# Patient Record
Sex: FEMALE | Race: BLACK OR AFRICAN AMERICAN | Employment: PART TIME | URBAN - METROPOLITAN AREA
[De-identification: names, ages, dates, MRNs, and addresses within clinical notes are randomized per-mention and may not be internally consistent; named-entity substitution may affect disease eponyms.]

---

## 2017-07-07 ENCOUNTER — HOSPITAL ENCOUNTER (EMERGENCY)
Age: 22
Discharge: HOME | End: 2017-07-07
Attending: EMERGENCY MEDICINE
Payer: COMMERCIAL

## 2017-07-07 VITALS
DIASTOLIC BLOOD PRESSURE: 77 MMHG | RESPIRATION RATE: 18 BRPM | WEIGHT: 222 LBS | TEMPERATURE: 98.5 F | SYSTOLIC BLOOD PRESSURE: 119 MMHG | HEIGHT: 72 IN | HEART RATE: 89 BPM | OXYGEN SATURATION: 99 % | BODY MASS INDEX: 30.07 KG/M2

## 2017-07-07 DIAGNOSIS — S60.051A CONTUSION OF RIGHT LITTLE FINGER WITHOUT DAMAGE TO NAIL, INITIAL ENCOUNTER: Primary | ICD-10-CM

## 2017-07-07 DIAGNOSIS — S60.041A CONTUSION OF RIGHT RING FINGER WITHOUT DAMAGE TO NAIL, INITIAL ENCOUNTER: ICD-10-CM

## 2017-07-07 PROCEDURE — 99283 EMERGENCY DEPT VISIT LOW MDM: CPT

## 2017-07-07 PROCEDURE — 77030008323 HC SPLNT FNGR GTR DJOR -A

## 2017-07-07 NOTE — LETTER
[facility] EMERGENCY DEPT 
[id number] [place] [address] [phone] Work Note July 7, 2017: To Whom It May concern: 
 
Wild Camacho was seen and evaluated today in the emergency room for an acute injury. Wild Camacho may return to work on 7/8/17. Duty restrictions as follows: limited use of right 4th and 5th fingers next 3-05 days. Sincerely, Isha Gaming MD

## 2017-07-07 NOTE — ED PROVIDER NOTES
HPI Comments: 12:56 PM Tigist Lewis is a 20 y.o. female who presents to ED c/o right 4th and 5th finger pain onset 7/4/17. Pt explains she was working as a  at a hotel when she \"snagged my fingers in the door. \" Pt was evaluated at [facility] after incident. Pt was not given a splint but was told it would heal on its own. Pt says that fingers are still \"numb and tingly. \"  No other concerns at this time. Patient is a 20 y.o. female presenting with finger pain. Finger Pain    Associated symptoms include numbness (4th and 5th finger). History reviewed. No pertinent past medical history. History reviewed. No pertinent surgical history. History reviewed. No pertinent family history. Social History     Social History    Marital status: SINGLE     Spouse name: N/A    Number of children: N/A    Years of education: N/A     Occupational History    Not on file. Social History Main Topics    Smoking status: Never Smoker    Smokeless tobacco: Never Used    Alcohol use No    Drug use: Not on file    Sexual activity: Not on file     Other Topics Concern    Not on file     Social History Narrative    No narrative on file         ALLERGIES: Review of patient's allergies indicates no known allergies. Review of Systems   Constitutional: Negative for chills and fever. HENT: Negative for sore throat. Respiratory: Negative for shortness of breath. Cardiovascular: Negative for chest pain. Gastrointestinal: Negative for diarrhea and vomiting. Musculoskeletal: Positive for myalgias (right 4th and 5th finger). Skin: Negative for rash. Neurological: Positive for numbness (4th and 5th finger). Negative for headaches. Vitals:    07/07/17 1254   BP: 119/77   Pulse: 89   Resp: 18   Temp: 98.5 °F (36.9 °C)   SpO2: 99%   Weight: 100.7 kg (222 lb)   Height: 6' 2\" (1.88 m)            Physical Exam   Constitutional: She is oriented to person, place, and time.  She appears well-developed and well-nourished. No distress. HENT:   Head: Normocephalic and atraumatic. Eyes: No scleral icterus. Cardiovascular: Normal rate. Pulmonary/Chest: Effort normal.   Musculoskeletal:   Bruising present over dorsal DIP joint R 5th finger w/ local STS, w/ less prominent swelling over DIP joint 4th finger. Flexion/ext intact. Subjective diminished LT to distal fingers 4th and 5th. Neurological: She is alert and oriented to person, place, and time. Skin: Skin is warm and dry. Psychiatric: She has a normal mood and affect. Nursing note and vitals reviewed. MDM  Number of Diagnoses or Management Options  Contusion of right little finger without damage to nail, initial encounter:   Contusion of right ring finger without damage to nail, initial encounter:   Diagnosis management comments: IMP: Finger contusions. Xray report reviewed--neg fx. Paresthesia likely secondary to local cutaneous nerve compression related to local STS. Splint applied. Ortho referral given. ED Course       Procedures    Vitals:  Patient Vitals for the past 12 hrs:   Temp Pulse Resp BP SpO2   07/07/17 1254 98.5 °F (36.9 °C) 89 18 119/77 99 %       Medications ordered:   Medications - No data to display      Lab findings:  No results found for this or any previous visit (from the past 12 hour(s)). EKG interpretation by ED Physician:    Progress notes, Consult notes or additional Procedure notes:   12:59 PM Pt chart reviewed. Pt had XR done on 7/4/17. Results verified, negative for fracture. Disposition:  Diagnosis:   1. Contusion of right little finger without damage to nail, initial encounter    2.  Contusion of right ring finger without damage to nail, initial encounter        Disposition: home    Follow-up Information     Follow up With Details Comments [facility] MD Kasi In 2 weeks As needed, If symptoms persist [id number] [place] [address] [address] [facility]              Patient's Medications    No medications on file     Scribe Attestation:     Duffy Snellen, scribing for and in the presence of  Alma Myers MD July 07, 2017 at 12:59 PM     Physician Attestation:   I personally performed the services described in this documentation, reviewed and edited the documentation which was dictated to the scribe in my presence, and it accurately records my words and actions.  Louise Loo MD  July 07, 2017     Signed by: Sarthak Chauhan, 07/07/17, 12:56 PM

## 2017-07-07 NOTE — ED TRIAGE NOTES
Pt c/o fingers being numb since Tuesday, was seen at [facility] on Tuesday for same.

## 2017-08-26 ENCOUNTER — HOSPITAL ENCOUNTER (EMERGENCY)
Age: 22
Discharge: HOME | End: 2017-08-26
Attending: EMERGENCY MEDICINE
Payer: COMMERCIAL

## 2017-08-26 ENCOUNTER — APPOINTMENT (OUTPATIENT)
Dept: GENERAL RADIOLOGY | Age: 22
End: 2017-08-26
Attending: EMERGENCY MEDICINE
Payer: COMMERCIAL

## 2017-08-26 VITALS
BODY MASS INDEX: 29.12 KG/M2 | WEIGHT: 215 LBS | DIASTOLIC BLOOD PRESSURE: 86 MMHG | OXYGEN SATURATION: 99 % | SYSTOLIC BLOOD PRESSURE: 123 MMHG | TEMPERATURE: 98.4 F | RESPIRATION RATE: 16 BRPM | HEART RATE: 98 BPM | HEIGHT: 72 IN

## 2017-08-26 DIAGNOSIS — S96.911A STRAIN OF RIGHT FOOT, INITIAL ENCOUNTER: Primary | ICD-10-CM

## 2017-08-26 PROCEDURE — 99283 EMERGENCY DEPT VISIT LOW MDM: CPT

## 2017-08-26 PROCEDURE — L4350 ANKLE CONTROL ORTHO PRE OTS: HCPCS

## 2017-08-26 PROCEDURE — 73630 X-RAY EXAM OF FOOT: CPT

## 2017-08-26 RX ORDER — ALBUTEROL SULFATE 90 UG/1
AEROSOL, METERED RESPIRATORY (INHALATION)
COMMUNITY
End: 2018-05-30

## 2017-08-26 RX ORDER — IBUPROFEN 600 MG/1
600 TABLET ORAL
Status: DISCONTINUED | OUTPATIENT
Start: 2017-08-26 | End: 2017-08-26 | Stop reason: HOSPADM

## 2017-08-26 RX ORDER — IBUPROFEN 600 MG/1
600 TABLET ORAL
Qty: 20 TAB | Refills: 0 | Status: SHIPPED | OUTPATIENT
Start: 2017-08-26 | End: 2018-07-05

## 2017-08-26 NOTE — LETTER
NOTIFICATION RETURN TO WORK / SCHOOL 
 
8/26/2017 7:59 AM 
 
Ms. Bandar Nieto [address] [place] [address] [phone] To Whom It May Concern: 
 
Bandar Nieto is currently under the care of [facility] EMERGENCY DEPT. She will return to work/school on: 8/27/2017 with limited duty with crutches until cleared by podiatry If there are questions or concerns please have the patient contact our office.  
 
 
 
Sincerely, 
 
 
Dr Tianna Casper MD

## 2017-08-26 NOTE — ED PROVIDER NOTES
HPI Comments: 7:23 AM  HAMMAD RICHEY Queenie Bazzi is a 20 y.o. female presenting to the ED c/o cramping, throbbing right foot pain x 2-3 weeks worsening. States pain was first mild, but now she can barely walk due to pain and standing aggravates pain. Reports occupation as a . Reports ace wrap helps with pain throughout the day. PMHx include asthma. Denies smoking hx or EtOH use. Denies knee pain, leg swelling, and any other sxs or complaints. PCP: Cesilia Johnson NP         Patient is a 20 y.o. female presenting with foot pain. The history is provided by the patient. Foot Pain    This is a new problem. The current episode started more than 1 week ago. The problem has been gradually worsening. The pain is present in the right foot. The pain is at a severity of 10/10. Associated symptoms include limited range of motion (due to pain). Pertinent negatives include no numbness, no tingling, no back pain and no neck pain. The symptoms are aggravated by standing, activity and movement. Treatments tried: Ace wrap. The treatment provided mild relief. There has been no history of extremity trauma. Past Medical History:   Diagnosis Date    Asthma        Past Surgical History:   Procedure Laterality Date    HX APPENDECTOMY      HX COLOSTOMY      HX GI      HX ORTHOPAEDIC      left knee    HX SPLENECTOMY           History reviewed. No pertinent family history. Social History     Social History    Marital status: SINGLE     Spouse name: N/A    Number of children: N/A    Years of education: N/A     Occupational History    Not on file. Social History Main Topics    Smoking status: Never Smoker    Smokeless tobacco: Never Used    Alcohol use No    Drug use: Not on file    Sexual activity: Not on file     Other Topics Concern    Not on file     Social History Narrative         ALLERGIES: Review of patient's allergies indicates no known allergies.     Review of Systems   Cardiovascular: Negative for leg swelling. Musculoskeletal: Positive for arthralgias. Negative for back pain, joint swelling and neck pain. Skin: Negative for wound. Neurological: Negative for tingling, weakness and numbness. All other systems reviewed and are negative. Vitals:    08/26/17 0712   BP: 123/86   Pulse: 98   Resp: 16   Temp: 98.4 °F (36.9 °C)   SpO2: 99%   Weight: 97.5 kg (215 lb)   Height: 6' 2\" (1.88 m)            Physical Exam   Constitutional: She is oriented to person, place, and time. She appears well-developed and well-nourished. No distress. HENT:   Head: Normocephalic and atraumatic. Mouth/Throat: Oropharynx is clear and moist.   Eyes: Conjunctivae and EOM are normal. Pupils are equal, round, and reactive to light. No scleral icterus. Neck: Normal range of motion. Neck supple. Cardiovascular: Normal rate, regular rhythm, normal heart sounds and intact distal pulses. No murmur heard. Pulses:       Dorsalis pedis pulses are 2+ on the right side, and 2+ on the left side. Pulmonary/Chest: Effort normal and breath sounds normal. No respiratory distress. Abdominal: Soft. Bowel sounds are normal. She exhibits no distension. There is no tenderness. Musculoskeletal: She exhibits tenderness. She exhibits no edema or deformity. Right foot: There is tenderness. Tender along right talus, right hind foot, and right ATFL. Anterior drawer intact. No erythema   Lymphadenopathy:     She has no cervical adenopathy. Neurological: She is alert and oriented to person, place, and time. Coordination normal.   Skin: Skin is warm and dry. No rash noted. Psychiatric: She has a normal mood and affect. Her behavior is normal.   Nursing note and vitals reviewed.        MDM  Number of Diagnoses or Management Options  Strain of right foot, initial encounter:   Diagnosis management comments: 2 - 3 weeks of R foot/ankle pain with extension and wt bearing no edema no tenderness over malleolus tender over ATFL and deltoid       Xray neg in ed     Ace wrap and velcro splint crutches podiatry follow up        Amount and/or Complexity of Data Reviewed  Tests in the radiology section of CPT®: ordered and reviewed      ED Course       Procedures    Vitals:  Patient Vitals for the past 12 hrs:   Temp Pulse Resp BP SpO2   08/26/17 0712 98.4 °F (36.9 °C) 98 16 123/86 99 %       Medications ordered:   Medications   ibuprofen (MOTRIN) tablet 600 mg (not administered)         Lab findings:  No results found for this or any previous visit (from the past 12 hour(s)). X-Ray, CT or other radiology findings or impressions:    7:53 AM  RADIOLOGY FINDINGS  Right foot X-ray shows no acute process  Pending review by Radiologist  Recorded by Ladonna Guevara, ED Scribe, as dictated by Norman Sarmiento MD     XR FOOT RT MIN 3 V    (Results Pending)       Progress notes, Consult notes or additional Procedure notes:   7:58 AM   HAMMAD Li's  results have been reviewed with her. She has been counseled regarding her diagnosis, treatment, and plan. She verbally conveys understanding and agreement of the signs, symptoms, diagnosis, treatment and prognosis and additionally agrees to follow up as discussed. She also agrees with the care-plan and conveys that all of her questions have been answered. I have also provided discharge instructions for her that include: educational information regarding their diagnosis and treatment, and list of reasons why they would want to return to the ED prior to their follow-up appointment, should her condition change. CLINICAL IMPRESSION:    1.  Strain of right foot, initial encounter        PLAN: DISCHARGE HOME    Follow-up Information     Follow up With Details Comments Contact [facility] EMERGENCY DEPT  As needed, If symptoms worsen [address] [place] [address]    Imtiaz Eng, NP Schedule an appointment as soon as possible for a visit for ED follow up appointment [phone] Jacqui Powell AV  [address]      Cassie Wright DPM Schedule an appointment as soon as possible for a visit for ED follow up  [address] [facility]  [phone]            Current Discharge Medication List      START taking these medications    Details   ibuprofen (MOTRIN) 600 mg tablet Take 1 Tab by mouth every six (6) hours as needed for Pain. Qty: 20 Tab, Refills: 0             Disposition:  Diagnosis:   1. Strain of right foot, initial encounter        Disposition: DISCHARGE    Follow-up Information     Follow up With Details Comments Contact [facility] EMERGENCY DEPT  As needed, If symptoms worsen [address] [place] [address] [address] [place] [address]    Mercy Ortiz NP Schedule an appointment as soon as possible for a visit for ED follow up appointment [address]dez [address] [facility] [address]      Cassie Wright DPM Schedule an appointment as soon as possible for a visit for ED follow up  [address]             Patient's Medications   Start Taking    IBUPROFEN (MOTRIN) 600 MG TABLET    Take 1 Tab by mouth every six (6) hours as needed for Pain. Continue Taking    ALBUTEROL (PROVENTIL HFA, VENTOLIN HFA, PROAIR HFA) 90 MCG/ACTUATION INHALER    Take  by inhalation. These Medications have changed    No medications on file   Stop Taking    No medications on file         Scribe Attestation:   August 26, 2017 at [address] [place] [address] for and in the presence of Shoshana Koyanagi, MD      Signed by: Sarthak Conde, 08/26/17 7:23 AM      I personally performed the services described in the documentation, reviewed the documentation recorded by the scribe in my presence and it accurately and completely records my words and actions.  Shoshana Koyanagi, MD)

## 2017-08-26 NOTE — LETTER
NOTIFICATION RETURN TO WORK / SCHOOL 
 
8/26/2017 7:59 AM 
 
Ms. Atilio Servin [address] [place] [phone] To Whom It May Concern: 
 
Atilio Servin is currently under the care of [facility] EMERGENCY DEPT. She will return to work/school on: 8/28/2017 with limited duty with crutches until cleared by podiatry If there are questions or concerns please have the patient contact our office.  
 
 
 
Sincerely, 
 
 
Dr Charles Encarnacion MD

## 2017-08-26 NOTE — ED TRIAGE NOTES
\"I have pain in my right foot. I don't remember doing anything to hurt it. Its been going on for a little while but its getting worse. \"

## 2017-08-26 NOTE — ED NOTES
Splint applied by Zeinab Burgos er tech. Crutches given and visualized usage. Pt d/c and all questions answered.

## 2017-12-05 ENCOUNTER — HOSPITAL ENCOUNTER (EMERGENCY)
Age: 22
Discharge: HOME | End: 2017-12-05
Attending: EMERGENCY MEDICINE
Payer: SELF-PAY

## 2017-12-05 ENCOUNTER — APPOINTMENT (OUTPATIENT)
Dept: GENERAL RADIOLOGY | Age: 22
End: 2017-12-05
Attending: PHYSICIAN ASSISTANT
Payer: SELF-PAY

## 2017-12-05 VITALS
DIASTOLIC BLOOD PRESSURE: 76 MMHG | HEART RATE: 90 BPM | RESPIRATION RATE: 16 BRPM | SYSTOLIC BLOOD PRESSURE: 130 MMHG | TEMPERATURE: 99 F | OXYGEN SATURATION: 100 %

## 2017-12-05 DIAGNOSIS — S61.012A LACERATION OF LEFT THUMB WITHOUT FOREIGN BODY WITHOUT DAMAGE TO NAIL, INITIAL ENCOUNTER: Primary | ICD-10-CM

## 2017-12-05 DIAGNOSIS — M79.645 PAIN OF FINGER OF LEFT HAND: ICD-10-CM

## 2017-12-05 PROCEDURE — 99282 EMERGENCY DEPT VISIT SF MDM: CPT

## 2017-12-05 PROCEDURE — 77030031132 HC SUT NYL COVD -A

## 2017-12-05 PROCEDURE — 77030018836 HC SOL IRR NACL ICUM -A

## 2017-12-05 PROCEDURE — 75810000293 HC SIMP/SUPERF WND  RPR

## 2017-12-05 PROCEDURE — 90715 TDAP VACCINE 7 YRS/> IM: CPT | Performed by: PHYSICIAN ASSISTANT

## 2017-12-05 PROCEDURE — 90471 IMMUNIZATION ADMIN: CPT

## 2017-12-05 PROCEDURE — 73140 X-RAY EXAM OF FINGER(S): CPT

## 2017-12-05 PROCEDURE — 74011250636 HC RX REV CODE- 250/636: Performed by: PHYSICIAN ASSISTANT

## 2017-12-05 RX ORDER — TRAMADOL HYDROCHLORIDE 50 MG/1
50 TABLET ORAL
Qty: 10 TAB | Refills: 0 | Status: SHIPPED | OUTPATIENT
Start: 2017-12-05 | End: 2018-07-05

## 2017-12-05 RX ADMIN — TETANUS TOXOID, REDUCED DIPHTHERIA TOXOID AND ACELLULAR PERTUSSIS VACCINE, ADSORBED 0.5 ML: 5; 2.5; 8; 8; 2.5 SUSPENSION INTRAMUSCULAR at 18:39

## 2017-12-05 NOTE — ED PROVIDER NOTES
HPI Comments: Moise Paulino is a 20 y.o. female that presents to the ED with a complaint of laceration and pain to left thumb x1 day. Pt states that she was helping her mother move and used a  which slipped cutting left thumb. SHe rates her pain as 4/10 and constant. Pt is unsure of last tetanus. NKDA    Patient is a 20 y.o. female presenting with skin laceration. Laceration           Past Medical History:   Diagnosis Date    Asthma        Past Surgical History:   Procedure Laterality Date    HX APPENDECTOMY      HX COLOSTOMY      HX GI      HX ORTHOPAEDIC      left knee    HX SPLENECTOMY           History reviewed. No pertinent family history. Social History     Social History    Marital status: SINGLE     Spouse name: N/A    Number of children: N/A    Years of education: N/A     Occupational History    Not on file. Social History Main Topics    Smoking status: Never Smoker    Smokeless tobacco: Never Used    Alcohol use No    Drug use: Not on file    Sexual activity: Not on file     Other Topics Concern    Not on file     Social History Narrative         ALLERGIES: Review of patient's allergies indicates no known allergies. Review of Systems   Constitutional: Negative for fatigue and fever. HENT: Negative for congestion. Respiratory: Negative for cough and shortness of breath. Cardiovascular: Negative for chest pain and leg swelling. Gastrointestinal: Negative for abdominal pain, diarrhea, nausea and vomiting. Genitourinary: Negative for difficulty urinating. Musculoskeletal: Negative for back pain. Skin: Positive for wound. Neurological: Negative for dizziness and headaches. All other systems reviewed and are negative. Vitals:    12/05/17 1642   BP: 130/76   Pulse: 90   Resp: 16   Temp: 99 °F (37.2 °C)   SpO2: 100%            Physical Exam   Constitutional: She is oriented to person, place, and time.  She appears well-developed and well-nourished. No distress. HENT:   Head: Normocephalic and atraumatic. Nose: Nose normal.   Eyes: Pupils are equal, round, and reactive to light. Cardiovascular: Normal rate. Pulmonary/Chest: Effort normal. No respiratory distress. Musculoskeletal: Normal range of motion. Neurological: She is alert and oriented to person, place, and time. Skin: Skin is warm. Laceration noted. 2 cm laceration to base of left thumb, 3 mm deep   Psychiatric: She has a normal mood and affect. Her behavior is normal.   Vitals reviewed. MDM  Number of Diagnoses or Management Options  Diagnosis management comments: Impression: laceration thumb, finger pain    Plan: wound closure  Discharge home    Return 7-10 days for suture removal sooner if signs of infection    ED Course       Wound Repair  Date/Time: 12/5/2017 6:33 PM  Performed by: Loretta Menendez provider: Ceasar Fleming  Preparation: skin prepped with Shur-Clens  Pre-procedure re-eval: Immediately prior to the procedure, the patient was reevaluated and found suitable for the planned procedure and any planned medications. Time out: Immediately prior to the procedure a time out was called to verify the correct patient, procedure, equipment, staff and marking as appropriate. .  Location details: left thumb  Wound length:2.5 cm or less  Anesthesia: local infiltration    Anesthesia:  Local Anesthetic: lidocaine 1% without epinephrine  Anesthetic total: 3 mL  Foreign bodies: no foreign bodies  Irrigation solution: saline  Irrigation method: syringe  Debridement: none  Skin closure: 5-0 nylon  Number of sutures: 4  Technique: simple and interrupted  Approximation: close  Dressing: splint and non-adhesive packing strip  Patient tolerance: Patient tolerated the procedure well with no immediate complications  My total time at bedside, performing this procedure was 16-30 minutes.           Vitals:  Patient Vitals for the past 12 hrs:   Temp Pulse Resp BP SpO2   12/05/17 1642 99 °F (37.2 °C) 90 16 130/76 100 %         Medications ordered:   Medications   diph,Pertuss(AC),Tet Vac-PF (BOOSTRIX) suspension 0.5 mL (not administered)         Lab findings:  No results found for this or any previous visit (from the past 12 hour(s)). X-Ray, CT or other radiology findings or impressions:  XR THUMB LT MIN 2 V    (Results Pending)       Progress notes, Consult notes or additional Procedure notes:       Disposition:  Diagnosis: No diagnosis found. Disposition: discharge    Follow-up Information     None           Patient's Medications   Start Taking    No medications on file   Continue Taking    ALBUTEROL (PROVENTIL HFA, VENTOLIN HFA, PROAIR HFA) 90 MCG/ACTUATION INHALER    Take  by inhalation. IBUPROFEN (MOTRIN) 600 MG TABLET    Take 1 Tab by mouth every six (6) hours as needed for Pain.    These Medications have changed    No medications on file   Stop Taking    No medications on file

## 2017-12-05 NOTE — DISCHARGE INSTRUCTIONS
Cuts on the Hand Closed With Stitches: Care Instructions  Your Care Instructions    A cut on your hand can be on your fingers, your thumb, or the front or back of your hand. Sometimes a cut can injure the tendons, blood vessels, or nerves of your hand. The doctor used stitches to close the cut. Using stitches also helps the cut heal and reduces scarring. The doctor may have given you a splint to help prevent you from moving your hand, fingers, or thumb. If the cut went deep and through the skin, the doctor put in two layers of stitches. The deeper layer brings the deep part of the cut together. These stitches will dissolve and don't need to be removed. The stitches in the upper layer are the ones you see on the cut. You will probably have a bandage. You will need to have the stitches removed, usually in 7 to 14 days. The doctor may suggest that you see a hand specialist if the cut is very deep or if you have trouble moving your fingers or have less feeling in your hand. The doctor has checked you carefully, but problems can develop later. If you notice any problems or new symptoms, get medical treatment right away. Follow-up care is a key part of your treatment and safety. Be sure to make and go to all appointments, and call your doctor if you are having problems. It's also a good idea to know your test results and keep a list of the medicines you take. How can you care for yourself at home? · Keep the cut dry for the first 24 to 48 hours. After this, you can shower if your doctor okays it. Pat the cut dry. · Don't soak the cut, such as in a bathtub. Your doctor will tell you when it's safe to get the cut wet. · If your doctor told you how to care for your cut, follow your doctor's instructions. If you did not get instructions, follow this general advice:  ¨ After the first 24 to 48 hours, wash around the cut with clean water 2 times a day.  Don't use hydrogen peroxide or alcohol, which can slow healing. ¨ You may cover the cut with a thin layer of petroleum jelly, such as Vaseline, and a nonstick bandage. ¨ Apply more petroleum jelly and replace the bandage as needed. · Prop up the sore hand on a pillow anytime you sit or lie down during the next 3 days. Try to keep it above the level of your heart. This will help reduce swelling. · Avoid any activity that could cause your cut to reopen. · Do not remove the stitches on your own. Your doctor will tell you when to come back to have the stitches removed. · Be safe with medicines. Take pain medicines exactly as directed. ¨ If the doctor gave you a prescription medicine for pain, take it as prescribed. ¨ If you are not taking a prescription pain medicine, ask your doctor if you can take an over-the-counter medicine. When should you call for help? Call your doctor now or seek immediate medical care if:  ? · You have new pain, or your pain gets worse. ? · The skin near the cut is cold or pale or changes color. ? · You have tingling, weakness, or numbness near the cut.   ? · The cut starts to bleed, and blood soaks through the bandage. Oozing small amounts of blood is normal.   ? · You have trouble moving the area of the hand near the cut.   ? · You have symptoms of infection, such as:  ¨ Increased pain, swelling, warmth, or redness around the cut. ¨ Red streaks leading from the cut. ¨ Pus draining from the cut. ¨ A fever. ? Watch closely for changes in your health, and be sure to contact your doctor if:  ? · You do not get better as expected. Where can you learn more? Go to http://dennis-todd.info/. Enter T250 in the search box to learn more about \"Cuts on the Hand Closed With Stitches: Care Instructions. \"  Current as of: March 20, 2017  Content Version: 11.4  © 1106-8426 [employer].  Care instructions adapted under license by [employer] (which disclaims liability or warranty for this information). If you have questions about a medical condition or this instruction, always ask your healthcare professional. [place] [address] any warranty or liability for your use of this information.

## 2017-12-05 NOTE — LETTER
NOTIFICATION RETURN TO WORK / SCHOOL 
 
12/5/2017 6:35 PM 
 
Ms. Maximus Cooney [address] [place] [phone] To Whom It May Concern: 
 
Maximus Cooney is currently under the care of [facility] EMERGENCY DEPT. She will return to work/school on: 12/6/17. NO lifting with left hand 7 days. If there are questions or concerns please have the patient contact our office.  
 
 
 
Sincerely, 
 
 
SAIDA Herrmann

## 2017-12-05 NOTE — ED NOTES
I performed a brief evaluation, including history and physical, of the patient here in triage and I have determined that pt will need further treatment and evaluation from the fast track provider. I have placed initial orders to help in expediting patients care. December 05, 2017 at 4:41 PM - Isak Granda DO        There were no vitals taken for this visit.

## 2018-05-30 ENCOUNTER — APPOINTMENT (OUTPATIENT)
Dept: GENERAL RADIOLOGY | Age: 23
End: 2018-05-30
Attending: NURSE PRACTITIONER
Payer: SELF-PAY

## 2018-05-30 ENCOUNTER — HOSPITAL ENCOUNTER (EMERGENCY)
Age: 23
Discharge: HOME | End: 2018-05-30
Attending: EMERGENCY MEDICINE
Payer: SELF-PAY

## 2018-05-30 VITALS
SYSTOLIC BLOOD PRESSURE: 115 MMHG | OXYGEN SATURATION: 100 % | RESPIRATION RATE: 16 BRPM | DIASTOLIC BLOOD PRESSURE: 64 MMHG | HEART RATE: 104 BPM | TEMPERATURE: 97.9 F

## 2018-05-30 DIAGNOSIS — J45.21 MILD INTERMITTENT ASTHMA WITH ACUTE EXACERBATION: Primary | ICD-10-CM

## 2018-05-30 PROCEDURE — 71046 X-RAY EXAM CHEST 2 VIEWS: CPT

## 2018-05-30 PROCEDURE — 94640 AIRWAY INHALATION TREATMENT: CPT

## 2018-05-30 PROCEDURE — 74011636637 HC RX REV CODE- 636/637: Performed by: NURSE PRACTITIONER

## 2018-05-30 PROCEDURE — 77030029684 HC NEB SM VOL KT MONA -A

## 2018-05-30 PROCEDURE — 99283 EMERGENCY DEPT VISIT LOW MDM: CPT

## 2018-05-30 PROCEDURE — 74011000250 HC RX REV CODE- 250: Performed by: NURSE PRACTITIONER

## 2018-05-30 RX ORDER — PREDNISONE 50 MG/1
50 TABLET ORAL DAILY
Qty: 3 TAB | Refills: 0 | Status: SHIPPED | OUTPATIENT
Start: 2018-05-30 | End: 2018-06-02

## 2018-05-30 RX ORDER — PREDNISONE 20 MG/1
60 TABLET ORAL
Status: COMPLETED | OUTPATIENT
Start: 2018-05-30 | End: 2018-05-30

## 2018-05-30 RX ORDER — ALBUTEROL SULFATE 90 UG/1
1 AEROSOL, METERED RESPIRATORY (INHALATION)
Qty: 1 INHALER | Refills: 0 | Status: SHIPPED | OUTPATIENT
Start: 2018-05-30

## 2018-05-30 RX ORDER — IPRATROPIUM BROMIDE AND ALBUTEROL SULFATE 2.5; .5 MG/3ML; MG/3ML
3 SOLUTION RESPIRATORY (INHALATION)
Status: COMPLETED | OUTPATIENT
Start: 2018-05-30 | End: 2018-05-30

## 2018-05-30 RX ADMIN — IPRATROPIUM BROMIDE AND ALBUTEROL SULFATE 3 ML: .5; 3 SOLUTION RESPIRATORY (INHALATION) at 12:35

## 2018-05-30 RX ADMIN — PREDNISONE 60 MG: 20 TABLET ORAL at 12:36

## 2018-05-30 NOTE — DISCHARGE INSTRUCTIONS
[employer] Activation    Thank you for requesting access to [employer] Please follow the instructions below to securely access and download your online medical record. [employer] allows you to send messages to your doctor, view your test results, renew your prescriptions, schedule appointments, and more. How Do I Sign Up? 1. In your internet browser, go to [email or web address]  2. Click on the First Time User? Click Here link in the Sign In box. You will be redirect to the New Member Sign Up page. 3. Enter your [employer] Access Code exactly as it appears below. You will not need to use this code after youve completed the sign-up process. If you do not sign up before the expiration date, you must request a new code. [employer] Access Code: [email or web address]  Expires: 2018 12:23 PM (This is the date your [employer] access code will )    4. Enter the last four digits of your Social Security Number (xxxx) and Date of Birth (mm/dd/yyyy) as indicated and click Submit. You will be taken to the next sign-up page. 5. Create a [employer] ID. This will be your [employer] login ID and cannot be changed, so think of one that is secure and easy to remember. 6. Create a [employer] password. You can change your password at any time. 7. Enter your Password Reset Question and Answer. This can be used at a later time if you forget your password. 8. Enter your e-mail address. You will receive e-mail notification when new information is available in [address]. 9. Click Sign Up. You can now view and download portions of your medical record. 10. Click the Download Summary menu link to download a portable copy of your medical information. Additional Information    If you have questions, please visit the Frequently Asked Questions section of the [employer] website at [email or web address] Remember, [employer] is NOT to be used for urgent needs. For medical emergencies, dial 911.

## 2018-05-30 NOTE — ED PROVIDER NOTES
HPI Comments: Kaur Corona is a 24year old female who presents to the ED with a c/o chest tightness secondary to asthma. States she's been taking albuterol at home, but it hasn't helped. Patient is a 24 y.o. female presenting with wheezing. The history is provided by the patient. History limited by: No communication barrier. Wheezing    This is a new problem. The current episode started 12 to 24 hours ago. The problem occurs constantly. The problem has not changed since onset. She has tried beta-agonist inhalers for the symptoms. She has had no prior hospitalizations. She has had no prior ED visits. Past Medical History:   Diagnosis Date    Asthma        Past Surgical History:   Procedure Laterality Date    HX APPENDECTOMY      HX COLOSTOMY      HX GI      HX ORTHOPAEDIC      left knee    HX SPLENECTOMY           No family history on file. Social History     Social History    Marital status: SINGLE     Spouse name: N/A    Number of children: N/A    Years of education: N/A     Occupational History    Not on file. Social History Main Topics    Smoking status: Never Smoker    Smokeless tobacco: Never Used    Alcohol use No    Drug use: Not on file    Sexual activity: Not on file     Other Topics Concern    Not on file     Social History Narrative         ALLERGIES: Review of patient's allergies indicates no known allergies. Review of Systems   Constitutional: Negative. HENT: Negative. Eyes: Negative. Respiratory: Positive for wheezing. Cardiovascular: Negative. Gastrointestinal: Negative. Endocrine: Negative. Genitourinary: Negative. Musculoskeletal: Negative. Skin: Negative. Allergic/Immunologic: Negative. Neurological: Negative. Hematological: Negative. Psychiatric/Behavioral: Negative.         Vitals:    05/30/18 1223   BP: 115/64   Pulse: (!) 104   Resp: 16   Temp: 97.9 °F (36.6 °C)   SpO2: 100%            Physical Exam Constitutional: She is oriented to person, place, and time. She appears well-developed and well-nourished. No distress. HENT:   Head: Normocephalic and atraumatic. Eyes: EOM are normal. Pupils are equal, round, and reactive to light. Neck: Normal range of motion. Neck supple. Cardiovascular: Normal rate, regular rhythm and normal heart sounds. Pulmonary/Chest: Effort normal and breath sounds normal. No respiratory distress. She has no wheezes. She has no rales. No wheezing     Abdominal: Soft. Bowel sounds are normal. There is no tenderness. There is no rebound. Genitourinary:   Genitourinary Comments: NE   Musculoskeletal: Normal range of motion. Neurological: She is alert and oriented to person, place, and time. No cranial nerve deficit. She exhibits normal muscle tone. Coordination normal.   Skin: Skin is warm and dry. Psychiatric: She has a normal mood and affect. Nursing note and vitals reviewed. MDM  Number of Diagnoses or Management Options  Mild intermittent asthma with acute exacerbation:   Diagnosis management comments: MDM:  Will DC the Pt home with Prednisone and a MDI. Priscilla Soler NP  2:58 PM    PROGRESS NOTE:   The CXR was reviewed. No acute findings. Priscilla Soler NP  2:59 PM           Amount and/or Complexity of Data Reviewed  Tests in the radiology section of CPT®: ordered and reviewed    Risk of Complications, Morbidity, and/or Mortality  Presenting problems: low  Diagnostic procedures: low  Management options: low          ED Course       Procedures    Diagnosis:   1. Mild intermittent asthma with acute exacerbation          Disposition:   Discharged to Home. Follow-up Information     None          Patient's Medications   Start Taking    ALBUTEROL (PROVENTIL HFA, VENTOLIN HFA, PROAIR HFA) 90 MCG/ACTUATION INHALER    Take 1 Puff by inhalation every four (4) hours as needed for Wheezing.     PREDNISONE (DELTASONE) 50 MG TABLET    Take 1 Tab by mouth daily for 3 days.   Continue Taking    IBUPROFEN (MOTRIN) 600 MG TABLET    Take 1 Tab by mouth every six (6) hours as needed for Pain. TRAMADOL (ULTRAM) 50 MG TABLET    Take 1 Tab by mouth every six (6) hours as needed for Pain. Max Daily Amount: 200 mg. These Medications have changed    No medications on file   Stop Taking    ALBUTEROL (PROVENTIL HFA, VENTOLIN HFA, PROAIR HFA) 90 MCG/ACTUATION INHALER    Take  by inhalation.

## 2018-05-30 NOTE — LETTER
NOTIFICATION RETURN TO WORK / SCHOOL 
 
5/30/2018 3:05 PM 
 
Ms. Gabe Ahuja [address] [place] [address] [phone] To Whom It May Concern: 
 
Gabe Ahuja is currently under the care of [facility] EMERGENCY DEPT. She will return to work/school on: 5/31/2018 If there are questions or concerns please have the patient contact our office.  
 
 
 
Sincerely, 
 
 
 
Bimal Gamez RN

## 2018-05-30 NOTE — ED NOTES
I have reviewed discharge instructions with the patient. The patient verbalized understanding. Patient armband removed and given to patient to take home. Patient was informed of the privacy risks if armband lost or stolen. Pt alert, oriented x4 and ambulatory out of ER in [facility] at this time. VSS.

## 2018-06-04 ENCOUNTER — HOSPITAL ENCOUNTER (EMERGENCY)
Age: 23
Discharge: HOME | End: 2018-06-04
Attending: EMERGENCY MEDICINE
Payer: SELF-PAY

## 2018-06-04 VITALS
WEIGHT: 210 LBS | DIASTOLIC BLOOD PRESSURE: 76 MMHG | SYSTOLIC BLOOD PRESSURE: 129 MMHG | BODY MASS INDEX: 31.83 KG/M2 | RESPIRATION RATE: 18 BRPM | OXYGEN SATURATION: 99 % | HEART RATE: 88 BPM | HEIGHT: 68 IN

## 2018-06-04 DIAGNOSIS — R04.0 EPISTAXIS: Primary | ICD-10-CM

## 2018-06-04 LAB
ANION GAP BLD CALC-SCNC: 15 MMOL/L (ref 10–20)
BUN BLD-MCNC: 8 MG/DL (ref 7–18)
CA-I BLD-MCNC: 1.18 MMOL/L (ref 1.12–1.32)
CHLORIDE BLD-SCNC: 104 MMOL/L (ref 100–108)
CO2 BLD-SCNC: 26 MMOL/L (ref 19–24)
CREAT UR-MCNC: 0.9 MG/DL (ref 0.6–1.3)
GLUCOSE BLD STRIP.AUTO-MCNC: 95 MG/DL (ref 74–106)
HCG UR QL: NEGATIVE
HCT VFR BLD CALC: 34 % (ref 36–49)
HGB BLD-MCNC: 11.6 G/DL (ref 12–16)
POTASSIUM BLD-SCNC: 4.1 MMOL/L (ref 3.5–5.5)
SODIUM BLD-SCNC: 140 MMOL/L (ref 136–145)

## 2018-06-04 PROCEDURE — 99283 EMERGENCY DEPT VISIT LOW MDM: CPT

## 2018-06-04 PROCEDURE — 81025 URINE PREGNANCY TEST: CPT

## 2018-06-04 PROCEDURE — 77030011649 HC PK NSL RHNO S&N -B

## 2018-06-04 PROCEDURE — 74011000250 HC RX REV CODE- 250

## 2018-06-04 PROCEDURE — 80047 BASIC METABLC PNL IONIZED CA: CPT

## 2018-06-04 PROCEDURE — 75810000284 HC CNTRL NASAL HEMORHRAGE SIMPLE

## 2018-06-04 PROCEDURE — 74011250637 HC RX REV CODE- 250/637: Performed by: EMERGENCY MEDICINE

## 2018-06-04 RX ORDER — IBUPROFEN 400 MG/1
800 TABLET ORAL
Status: DISCONTINUED | OUTPATIENT
Start: 2018-06-04 | End: 2018-06-04

## 2018-06-04 RX ORDER — ACETAMINOPHEN 500 MG
1000 TABLET ORAL
Status: DISCONTINUED | OUTPATIENT
Start: 2018-06-04 | End: 2018-06-04

## 2018-06-04 RX ORDER — CYCLOBENZAPRINE HCL 10 MG
5 TABLET ORAL
Status: DISCONTINUED | OUTPATIENT
Start: 2018-06-04 | End: 2018-06-04

## 2018-06-04 RX ORDER — AMOXICILLIN AND CLAVULANATE POTASSIUM 875; 125 MG/1; MG/1
1 TABLET, FILM COATED ORAL
Status: COMPLETED | OUTPATIENT
Start: 2018-06-04 | End: 2018-06-04

## 2018-06-04 RX ORDER — OXYMETAZOLINE HCL 0.05 %
2 SPRAY, NON-AEROSOL (ML) NASAL
Status: COMPLETED | OUTPATIENT
Start: 2018-06-04 | End: 2018-06-04

## 2018-06-04 RX ORDER — AMOXICILLIN AND CLAVULANATE POTASSIUM 875; 125 MG/1; MG/1
1 TABLET, FILM COATED ORAL 2 TIMES DAILY
Qty: 14 TAB | Refills: 0 | Status: SHIPPED | OUTPATIENT
Start: 2018-06-04 | End: 2018-07-05

## 2018-06-04 RX ORDER — LIDOCAINE HYDROCHLORIDE 20 MG/ML
SOLUTION OROPHARYNGEAL
Status: COMPLETED
Start: 2018-06-04 | End: 2018-06-04

## 2018-06-04 RX ADMIN — OXYMETAZOLINE HYDROCHLORIDE 2 SPRAY: 5 SPRAY NASAL at 08:53

## 2018-06-04 RX ADMIN — LIDOCAINE HYDROCHLORIDE 15 ML: 20 SOLUTION ORAL; TOPICAL at 08:53

## 2018-06-04 RX ADMIN — AMOXICILLIN AND CLAVULANATE POTASSIUM 1 TABLET: 875; 125 TABLET, FILM COATED ORAL at 09:28

## 2018-06-04 NOTE — DISCHARGE INSTRUCTIONS
Nosebleeds: Care Instructions  Your Care Instructions    Nosebleeds are common, especially if you have colds or allergies. Many things can cause a nosebleed. Some nosebleeds stop on their own with pressure. Others need packing. Some get cauterized (sealed). If you have gauze or other packing materials in your nose, you will need to follow up with your doctor to have the packing removed. You may need more treatment if you get nosebleeds a lot. The doctor has checked you carefully, but problems can develop later. If you notice any problems or new symptoms, get medical treatment right away. Follow-up care is a key part of your treatment and safety. Be sure to make and go to all appointments, and call your doctor if you are having problems. It's also a good idea to know your test results and keep a list of the medicines you take. How can you care for yourself at home? · If you get another nosebleed:  ¨ Sit up and tilt your head slightly forward. This keeps blood from going down your throat. ¨ Use your thumb and index finger to pinch your nose shut for 10 minutes. Use a clock. Do not check to see if the bleeding has stopped before the 10 minutes are up. If the bleeding has not stopped, pinch your nose shut for another 10 minutes. ¨ When the bleeding has stopped, try not to pick, rub, or blow your nose for 12 hours. Avoiding these things helps keep your nose from bleeding again. · If your doctor prescribed antibiotics, take them as directed. Do not stop taking them just because you feel better. You need to take the full course of antibiotics. To prevent nosebleeds  · Do not blow your nose too hard. · Try not to lift or strain after a nosebleed. · Raise your head on a pillow while you sleep. · Put a thin layer of a saline- or water-based nasal gel, such as NasoGel, inside your nose. Put it on the septum, which divides your nostrils. This will prevent dryness that can cause nosebleeds.   · Use a vaporizer or humidifier to add moisture to your bedroom. Follow the directions for cleaning the machine. · Do not use aspirin, ibuprofen (Advil, Motrin), or naproxen (Aleve) for 36 to 48 hours after a nosebleed unless your doctor tells you to. You can use acetaminophen (Tylenol) for pain relief. · Talk to your doctor about stopping any other medicines you are taking. Some medicines may make you more likely to get a nosebleed. · Do not use cold medicines or nasal sprays without first talking to your doctor. They can make your nose dry. When should you call for help? Call 911 anytime you think you may need emergency care. For example, call if:  ? · You passed out (lost consciousness). ?Call your doctor now or seek immediate medical care if:  ? · You get another nosebleed and your nose is still bleeding after you have applied pressure 3 times for 10 minutes each time (30 minutes total). ? · There is a lot of blood running down the back of your throat even after you pinch your nose and tilt your head forward. ? · You have a fever. ? · You have sinus pain. ? Watch closely for changes in your health, and be sure to contact your doctor if:  ? · You get nosebleeds often, even if they stop. ? · You do not get better as expected. Where can you learn more? Go to http://dennis-todd.info/. Enter S156 in the search box to learn more about \"Nosebleeds: Care Instructions. \"  Current as of: March 20, 2017  Content Version: 11.4  © 2476-8129 [employer]. Care instructions adapted under license by [employer] (which disclaims liability or warranty for this information). If you have questions about a medical condition or this instruction, always ask your healthcare professional. [address] any warranty or liability for your use of this information.

## 2018-06-04 NOTE — ED PROVIDER NOTES
EMERGENCY DEPARTMENT HISTORY AND PHYSICAL EXAM    8:42 AM      Date: 6/4/2018  Patient Name: Herminia Pillai    History of Presenting Illness     Chief Complaint   Patient presents with    Epistaxis         History Provided By: Patient    Chief Complaint: Left sided nosebleed  Duration:  Hours  Timing:  Constant  Location: Left nare  Quality:   Severity: Moderate  Modifying Factors: None  Associated Symptoms: denies any other associated signs or symptoms      Additional History (Context): Herminia Pillai is a 24 y.o. female with a hx of asthma and high cholesterol who presents with complaints of a left sided nosebleed that started at 0600 this morning upon waking up. The blood is trickling down her throat. She was able to control the bleeding initially, but when she got to work it started again. At work a coworker tried to help control the bleeding then brought her to the ED. 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 She notes that her mother checked her blood pressure this morning and it was a little high, she has no hx of HTN. She has had nosebleeds in the past. She has normal periods, they have never been heavy. Her LNMP was a couple of weeks ago. She denies trauma, HA, fever, and any further complaints. PCP: None    Current Facility-Administered Medications   Medication Dose Route Frequency Provider Last Rate Last Dose    amoxicillin-clavulanate (AUGMENTIN) 875-125 mg per tablet 1 Tab  1 Tab Oral NOW Pablito Nicholas MD         Current Outpatient Prescriptions   Medication Sig Dispense Refill    amoxicillin-clavulanate (AUGMENTIN) 875-125 mg per tablet Take 1 Tab by mouth two (2) times a day. 14 Tab 0    albuterol (PROVENTIL HFA, VENTOLIN HFA, PROAIR HFA) 90 mcg/actuation inhaler Take 1 Puff by inhalation every four (4) hours as needed for Wheezing. 1 Inhaler 0    traMADol (ULTRAM) 50 mg tablet Take 1 Tab by mouth every six (6) hours as needed for Pain.  Max Daily Amount: 200 mg. 10 Tab 0    ibuprofen (MOTRIN) 600 mg tablet Take 1 Tab by mouth every six (6) hours as needed for Pain. 20 Tab 0       Past History     Past Medical History:  Past Medical History:   Diagnosis Date    Asthma        Past Surgical History:  Past Surgical History:   Procedure Laterality Date    HX APPENDECTOMY      HX COLOSTOMY      HX GI      HX ORTHOPAEDIC      left knee    HX SPLENECTOMY         Family History:  History reviewed. No pertinent family history. Social History:  Social History   Substance Use Topics    Smoking status: Never Smoker    Smokeless tobacco: Never Used    Alcohol use No       Allergies:  No Known Allergies      Review of Systems     Review of Systems   Constitutional: Negative for activity change, fatigue and fever. HENT: Positive for nosebleeds. Negative for congestion and rhinorrhea. Eyes: Negative for visual disturbance. Respiratory: Negative for shortness of breath. Cardiovascular: Negative for chest pain and palpitations. Gastrointestinal: Negative for abdominal pain, diarrhea, nausea and vomiting. Genitourinary: Negative for dysuria and hematuria. Musculoskeletal: Negative for back pain. Skin: Negative for rash. Neurological: Negative for dizziness, weakness and light-headedness. Psychiatric/Behavioral: Negative for agitation. All other systems reviewed and are negative. Physical Exam     Visit Vitals    /76 (BP 1 Location: Left arm, BP Patient Position: At rest)    Pulse 88    Resp 18    Ht 5' 8\" (1.727 m)    Wt 95.3 kg (210 lb)    SpO2 99%    BMI 31.93 kg/m2       Physical Exam   Constitutional: She appears well-developed and well-nourished. No distress. HENT:   Head: Normocephalic and atraumatic. Right Ear: External ear normal.   Left Ear: External ear normal.   Mouth/Throat: Oropharynx is clear and moist.   Bleeding from left nostril, persistent and significant, unable to find site of bleeding.     Eyes: Conjunctivae and EOM are normal. Pupils are equal, round, and reactive to light. No scleral icterus. Neck: Normal range of motion. Neck supple. No JVD present. No tracheal deviation present. No thyromegaly present. Cardiovascular: Normal rate and regular rhythm. Exam reveals no friction rub. No murmur heard. Pulmonary/Chest: Effort normal and breath sounds normal. No stridor. She exhibits no tenderness. Abdominal: Soft. Bowel sounds are normal. She exhibits no distension. There is no tenderness. There is no rebound and no guarding. Musculoskeletal: Normal range of motion. She exhibits no edema or tenderness. Lymphadenopathy:     She has no cervical adenopathy. Neurological: She is alert. No cranial nerve deficit. Coordination normal.   Skin: Skin is warm and dry. Psychiatric: She has a normal mood and affect. Her behavior is normal. Judgment and thought content normal.   Nursing note and vitals reviewed. Diagnostic Study Results     Labs -  Recent Results (from the past 12 hour(s))   POC CHEM8    Collection Time: 06/04/18  8:58 AM   Result Value Ref Range    CO2, POC 26 (H) 19 - 24 MMOL/L    Glucose, POC 95 74 - 106 MG/DL    BUN, POC 8 7 - 18 MG/DL    Creatinine, POC 0.9 0.6 - 1.3 MG/DL    GFRAA, POC >60 >60 ml/min/1.73m2    GFRNA, POC >60 >60 ml/min/1.73m2    Sodium,  136 - 145 MMOL/L    Potassium, POC 4.1 3.5 - 5.5 MMOL/L    Calcium, ionized (POC) 1.18 1.12 - 1.32 mmol/L    Chloride,  100 - 108 MMOL/L    Anion gap, POC 15 10 - 20      Hematocrit, POC 34 (L) 36 - 49 %    Hemoglobin, POC 11.6 (L) 12 - 16 G/DL       Radiologic Studies -   No orders to display         Medical Decision Making   I am the first provider for this patient. I reviewed the vital signs, available nursing notes, past medical history, past surgical history, family history and social history. Vital Signs-Reviewed the patient's vital signs.     Pulse Oximetry Analysis -  99% on room air (Interpretation)WNL    Cardiac Monitor:  Rate: 88 BPM  Rhythm:  Normal Sinus Rhythm       Records Reviewed: Nursing Notes and Old Medical Records (Time of Review: 8:42 AM)    ED Course: Progress Notes, Reevaluation, and Consults:  9:22 AM Pt reevaluated at this time and is resting comfortably in NAD. Discussed results and findings, as well as, diagnosis and plan for discharge. Pt verbalizes understanding and agreement with plan. All questions addressed at this time. Provider Notes (Medical Decision Making):   Pt with epistaxis anterior bleed. No blood thinners, no heavy vaginal bleeding. No coagulopathy. Sx tx in ER. Rhinorocket placed, bleeding stopped. Abx started, f/u with ENT. Procedures:  Epistaxis Management  Date/Time: 6/4/2018 8:46 AM  Performed by: Peyman Rose  Authorized by: Peyman Rose     Consent:     Consent obtained:  Verbal    Consent given by:  Patient    Risks discussed:  Bleeding, infection, nasal injury and pain    Alternatives discussed:  Alternative treatment  Anesthesia (see MAR for exact dosages): Anesthesia method:  Topical application    Topical anesthetic:  Lidocaine gel  Procedure details:     Treatment site:  L anterior    Treatment method:  Nasal balloon    Treatment complexity:  Limited    Treatment episode: initial    Post-procedure details:     Assessment:  Bleeding stopped    Patient tolerance of procedure: Tolerated well, no immediate complications  Comments:      Hypopharynx without any bleeding post placement. Diagnosis     Clinical Impression:   1. Epistaxis        Disposition: Discharge    Follow-up Information     Follow up With Details Comments Contact Info    Felicia Amos MD Call in 1 day Follow Up From Emergency [facility] [address] [facility] [place] [id number] [phone]             Patient's Medications   Start Taking    AMOXICILLIN-CLAVULANATE (AUGMENTIN) 875-125 MG PER TABLET    Take 1 Tab by mouth two (2) times a day.    Continue Taking    ALBUTEROL (PROVENTIL HFA, VENTOLIN HFA, PROAIR HFA) 90 MCG/ACTUATION INHALER    Take 1 Puff by inhalation every four (4) hours as needed for Wheezing. IBUPROFEN (MOTRIN) 600 MG TABLET    Take 1 Tab by mouth every six (6) hours as needed for Pain. TRAMADOL (ULTRAM) 50 MG TABLET    Take 1 Tab by mouth every six (6) hours as needed for Pain. Max Daily Amount: 200 mg. These Medications have changed    No medications on file   Stop Taking    No medications on file     _______________________________    Attestations:  [facility] acting as a scribe for and in the presence of Pablito Nicholas MD      June 04, 2018 at 9:23 AM       Provider Attestation:      I personally performed the services described in the documentation, reviewed the documentation, as recorded by the scribe in my presence, and it accurately and completely records my words and actions.  June 04, 2018 at 9:23 AM - Pablito Nicholas MD    _______________________________

## 2018-06-04 NOTE — LETTER
[facility] EMERGENCY DEPT 
[address] [place] [id number] [phone] Work/School Note Date: 6/4/2018 To Whom It May concern: 
 
Disha Carrillo was seen and treated today in the emergency room by the following provider(s): 
Attending Provider: Esthela Pete MD. Disha Carrillo may return to work on 6/5/18. Sincerely, Nicole Shah RN

## 2018-07-05 ENCOUNTER — HOSPITAL ENCOUNTER (EMERGENCY)
Age: 23
Discharge: HOME | End: 2018-07-05
Attending: EMERGENCY MEDICINE
Payer: SELF-PAY

## 2018-07-05 ENCOUNTER — APPOINTMENT (OUTPATIENT)
Dept: GENERAL RADIOLOGY | Age: 23
End: 2018-07-05
Attending: EMERGENCY MEDICINE
Payer: SELF-PAY

## 2018-07-05 VITALS
OXYGEN SATURATION: 99 % | TEMPERATURE: 98.1 F | DIASTOLIC BLOOD PRESSURE: 72 MMHG | RESPIRATION RATE: 16 BRPM | HEART RATE: 75 BPM | BODY MASS INDEX: 30.61 KG/M2 | HEIGHT: 72 IN | WEIGHT: 226 LBS | SYSTOLIC BLOOD PRESSURE: 120 MMHG

## 2018-07-05 DIAGNOSIS — S63.91XA HAND SPRAIN, RIGHT, INITIAL ENCOUNTER: ICD-10-CM

## 2018-07-05 DIAGNOSIS — M79.641 PAIN OF RIGHT HAND: Primary | ICD-10-CM

## 2018-07-05 PROCEDURE — 73130 X-RAY EXAM OF HAND: CPT

## 2018-07-05 PROCEDURE — 99283 EMERGENCY DEPT VISIT LOW MDM: CPT

## 2018-07-05 PROCEDURE — L3908 WHO COCK-UP NONMOLDE PRE OTS: HCPCS

## 2018-07-05 RX ORDER — TRAZODONE HYDROCHLORIDE 100 MG/1
100 TABLET ORAL
COMMUNITY

## 2018-07-05 RX ORDER — NAPROXEN 500 MG/1
500 TABLET ORAL 2 TIMES DAILY WITH MEALS
Qty: 20 TAB | Refills: 0 | Status: SHIPPED | OUTPATIENT
Start: 2018-07-05

## 2018-07-05 RX ORDER — SERTRALINE HYDROCHLORIDE 100 MG/1
TABLET, FILM COATED ORAL DAILY
COMMUNITY

## 2018-07-05 NOTE — ED PROVIDER NOTES
EMERGENCY DEPARTMENT HISTORY AND PHYSICAL EXAM    Date: 7/5/2018  Patient Name: Lily Burnette    History of Presenting Illness     Chief Complaint   Patient presents with    Hand Pain         History Provided By: Patient    Chief Complaint: hand pain  Duration: 1 Days  Timing:  Constant  Location:right hand  Quality: Cramping  Severity: 8 out of 10  Modifying Factors: worse with movement  Associated Symptoms: denies any other associated signs or symptoms      Additional History (Context): Lily Burnette is a 24 y.o. female with No significant past medical history who presents with right hand pain after horse playing in a pool yesterday. PT states she took some Ibuprofen yesterday with no relief. NO other complaints at this time. PCP: Dawn Toussaint NP    Current Outpatient Prescriptions   Medication Sig Dispense Refill    sertraline (ZOLOFT) 100 mg tablet Take  by mouth daily.  traZODone (DESYREL) 100 mg tablet Take 100 mg by mouth nightly.  naproxen (NAPROSYN) 500 mg tablet Take 1 Tab by mouth two (2) times daily (with meals). 20 Tab 0    albuterol (PROVENTIL HFA, VENTOLIN HFA, PROAIR HFA) 90 mcg/actuation inhaler Take 1 Puff by inhalation every four (4) hours as needed for Wheezing. 1 Inhaler 0       Past History     Past Medical History:  Past Medical History:   Diagnosis Date    Asthma        Past Surgical History:  Past Surgical History:   Procedure Laterality Date    HX APPENDECTOMY      HX COLOSTOMY      HX GI      HX ORTHOPAEDIC      left knee    HX SPLENECTOMY         Family History:  History reviewed. No pertinent family history. Social History:  Social History   Substance Use Topics    Smoking status: Never Smoker    Smokeless tobacco: Never Used    Alcohol use No       Allergies:  No Known Allergies      Review of Systems   Review of Systems   Constitutional: Negative for fatigue. HENT: Negative for congestion.     Respiratory: Negative for cough and shortness of breath. Cardiovascular: Negative for chest pain. Gastrointestinal: Negative for diarrhea, nausea and vomiting. Genitourinary: Negative for dysuria. Musculoskeletal: Positive for arthralgias, joint swelling and myalgias. Negative for back pain. Skin: Negative for wound. Neurological: Negative for headaches. All Other Systems Negative  Physical Exam     Vitals:    07/05/18 0940   BP: 120/72   Pulse: 75   Resp: 16   Temp: 98.1 °F (36.7 °C)   SpO2: 99%   Weight: 102.5 kg (226 lb)   Height: 6' 2\" (1.88 m)     Physical Exam   Constitutional: She appears well-developed and well-nourished. No distress. HENT:   Head: Normocephalic and atraumatic. Nose: Nose normal.   Eyes: Conjunctivae are normal. Pupils are equal, round, and reactive to light. Neck: Normal range of motion. Cardiovascular: Normal rate, regular rhythm and normal heart sounds. Pulmonary/Chest: Breath sounds normal. No respiratory distress. She has no wheezes. Musculoskeletal:        Right hand: She exhibits decreased range of motion, tenderness and bony tenderness. She exhibits normal two-point discrimination, normal capillary refill, no deformity, no laceration and no swelling. Normal sensation noted. Skin: Skin is warm and dry. Psychiatric: She has a normal mood and affect. Her behavior is normal.   Vitals reviewed. Diagnostic Study Results     Labs -   No results found for this or any previous visit (from the past 12 hour(s)). Radiologic Studies -   XR HAND RT MIN 3 V   Final Result        CT Results  (Last 48 hours)    None        CXR Results  (Last 48 hours)    None            Medical Decision Making   I am the first provider for this patient. I reviewed the vital signs, available nursing notes, past medical history, past surgical history, family history and social history. Vital Signs-Reviewed the patient's vital signs. Pulse Oximetry Analysis - 99% on RA      Records Reviewed:  Old Medical Records    Procedures:  Procedures    Provider Notes (Medical Decision Making):   XR Results (most recent):    Results from Hospital Encounter encounter on 07/05/18   XR HAND RT MIN 3 V   Narrative Right hand:    INDICATION: Right hand pain status post trauma. 3 views. No acute fracture or dislocation. No significant focal soft tissue swelling. Bone mineralization is within normal limits. No radiopaque soft tissue foreign  body. Impression IMPRESSION:    1. Unremarkable right hand. NVI following application of splint    MED RECONCILIATION:  No current facility-administered medications for this encounter. Current Outpatient Prescriptions   Medication Sig    sertraline (ZOLOFT) 100 mg tablet Take  by mouth daily.  traZODone (DESYREL) 100 mg tablet Take 100 mg by mouth nightly.  naproxen (NAPROSYN) 500 mg tablet Take 1 Tab by mouth two (2) times daily (with meals).  albuterol (PROVENTIL HFA, VENTOLIN HFA, PROAIR HFA) 90 mcg/actuation inhaler Take 1 Puff by inhalation every four (4) hours as needed for Wheezing. Disposition:  discharge    DISCHARGE NOTE:     Pt has been reexamined. Patient has no new complaints, changes, or physical findings. Care plan outlined and precautions discussed. Results of exam/xray were reviewed with the patient. All medications were reviewed with the patient; will d/c home with splint and anti inflammaoroy medications. All of pt's questions and concerns were addressed. Patient was instructed and agrees to follow up with Ortho, as well as to return to the ED upon further deterioration. Patient is ready to go home.     Follow-up Information     Follow up With Details Comments [id number] [facility], Tia Hernandez [address] Call As needed, follow up Horace [address] [place] [address]    Paolo Sullivan NP Call As needed, follow up [address] Hdez [address]   [phone] Current Discharge Medication List      START taking these medications    Details   naproxen (NAPROSYN) 500 mg tablet Take 1 Tab by mouth two (2) times daily (with meals). Qty: 20 Tab, Refills: 0         STOP taking these medications       ibuprofen (MOTRIN) 600 mg tablet Comments:   Reason for Stopping:                 Diagnosis     Clinical Impression:   1. Pain of right hand    2.  Hand sprain, right, initial encounter

## 2018-07-05 NOTE — DISCHARGE INSTRUCTIONS
Hand Pain: Care Instructions  Your Care Instructions    Common causes of hand pain are overuse and injuries, such as might happen during sports or home repair projects. Everyday wear and tear, especially as you get older, also can cause hand pain. Most minor hand injuries will heal on their own, and home treatment is usually all you need to do. If you have sudden and severe pain, you may need tests and treatment. Follow-up care is a key part of your treatment and safety. Be sure to make and go to all appointments, and call your doctor if you are having problems. It's also a good idea to know your test results and keep a list of the medicines you take. How can you care for yourself at home? · Take pain medicines exactly as directed. ¨ If the doctor gave you a prescription medicine for pain, take it as prescribed. ¨ If you are not taking a prescription pain medicine, ask your doctor if you can take an over-the-counter medicine. · Rest and protect your hand. Take a break from any activity that may cause pain. · Put ice or a cold pack on your hand for 10 to 20 minutes at a time. Put a thin cloth between the ice and your skin. · Prop up the sore hand on a pillow when you ice it or anytime you sit or lie down during the next 3 days. Try to keep it above the level of your heart. This will help reduce swelling. · If your doctor recommends a sling, splint, or elastic bandage to support your hand, wear it as directed. When should you call for help? Call 911 anytime you think you may need emergency care. For example, call if:  ? · Your hand turns cool or pale or changes color. ?Call your doctor now or seek immediate medical care if:  ? · You cannot move your hand. ? · Your hand pops, moves out of its normal position, and then returns to its normal position. ? · You have signs of infection, such as:  ¨ Increased pain, swelling, warmth, or redness. ¨ Red streaks leading from the sore area.   ¨ Pus draining from a place on your hand. ¨ A fever. ? · Your hand feels numb or tingly. ? Watch closely for changes in your health, and be sure to contact your doctor if:  ? · Your hand feels unstable when you try to use it. ? · You do not get better as expected. ? · You have any new symptoms, such as swelling. ? · Bruises from an injury to your hand last longer than 2 weeks. Where can you learn more? Go to http://dennis-todd.info/. Enter R273 in the search box to learn more about \"Hand Pain: Care Instructions. \"  Current as of: March 20, 2017  Content Version: 11.4  © 2894-7161 [employer]. Care instructions adapted under license by [employer] (which disclaims liability or warranty for this information). If you have questions about a medical condition or this instruction, always ask your healthcare professional. [place] [address] any warranty or liability for your use of this information. Hand Sprain: Care Instructions  Your Care Instructions  A hand sprain occurs when you stretch or tear a ligament in your hand. Ligaments are the tough tissues that connect one bone to another. Most hand sprains will heal with treatment you can do at home. Follow-up care is a key part of your treatment and safety. Be sure to make and go to all appointments, and call your doctor if you are having problems. It's also a good idea to know your test results and keep a list of the medicines you take. How can you care for yourself at home? · If your doctor gave you a splint or immobilizer, wear it as directed. This will help keep swelling down and help your hand heal.  · Follow your doctor's directions for exercise and other activity. · For the first 2 days after your injury, avoid things that might increase swelling, such as hot showers, hot tubs, or hot packs. · Put ice or a cold pack on your hand for 10 to 20 minutes at a time to stop swelling.  Try this every 1 to 2 hours for 3 days (when you are awake) or until the swelling goes down. Put a thin cloth between the ice pack and your skin. Keep your splint dry. · After 2 or 3 days, if your swelling is gone, put a heating pad (set on low) or a warm cloth on your hand. Some experts suggest that you go back and forth between hot and cold treatments. · Prop up your hand on a pillow when you ice it or anytime you sit or lie down. Try to keep it above the level of your heart. This will help reduce swelling. · Take pain medicines exactly as directed. ¨ If the doctor gave you a prescription medicine for pain, take it as prescribed. ¨ If you are not taking a prescription pain medicine, ask your doctor if you can take an over-the-counter medicine. · Return to your usual level of activity slowly. When should you call for help? Call your doctor now or seek immediate medical care if:  ? · Your pain is worse. ? · You have new or increased swelling in your hand. ? · You cannot move your hand. ? · You have tingling, weakness, or numbness in your hand or fingers. ? · Your hand or fingers are cool or pale or change color. ? · You have a fever. ? · Your hand or fingers are red. ? Watch closely for changes in your health, and be sure to contact your doctor if:  ? · Your hand does not get better as expected. Where can you learn more? Go to http://dennis-todd.info/. Enter L560 in the search box to learn more about \"Hand Sprain: Care Instructions. \"  Current as of: March 21, 2017  Content Version: 11.4  © 3348-5936 [employer] Care instructions adapted under license by [employer] (which disclaims liability or warranty for this information). If you have questions about a medical condition or this instruction, always ask your healthcare professional. [address] any warranty or liability for your use of this information.

## 2018-07-05 NOTE — LETTER
NOTIFICATION RETURN TO WORK / SCHOOL 
 
7/5/2018 10:54 AM 
 
Ms. Moise Morales [address] [place] [address] [phone] To Whom It May Concern: 
 
Moise Morales is currently under the care of [facility] EMERGENCY DEPT. She will return to work/school on: 7/7/18. If there are questions or concerns please have the patient contact our office.  
 
 
 
Sincerely, 
 
 
SAIDA Young

## 2018-12-16 ENCOUNTER — HOSPITAL ENCOUNTER (EMERGENCY)
Age: 23
Discharge: HOME | End: 2018-12-16
Attending: EMERGENCY MEDICINE
Payer: SELF-PAY

## 2018-12-16 VITALS
SYSTOLIC BLOOD PRESSURE: 130 MMHG | DIASTOLIC BLOOD PRESSURE: 74 MMHG | TEMPERATURE: 98.1 F | OXYGEN SATURATION: 100 % | HEART RATE: 88 BPM | RESPIRATION RATE: 16 BRPM

## 2018-12-16 DIAGNOSIS — M79.671 RIGHT FOOT PAIN: Primary | ICD-10-CM

## 2018-12-16 PROCEDURE — 99282 EMERGENCY DEPT VISIT SF MDM: CPT

## 2018-12-16 RX ORDER — IBUPROFEN 800 MG/1
800 TABLET ORAL EVERY 8 HOURS
Qty: 15 TAB | Refills: 0 | Status: SHIPPED | OUTPATIENT
Start: 2018-12-16 | End: 2018-12-21

## 2018-12-16 NOTE — ED PROVIDER NOTES
EMERGENCY DEPARTMENT HISTORY AND PHYSICAL EXAM    1:04 AM      Date: 12/16/2018  Patient Name: Luis Alberto Garber    History of Presenting Illness     Chief Complaint   Patient presents with    Foot Pain         History Provided By: Patient    1:07 AM Luis Alberto Garber is a 24 y.o. female with h/o Asthma who presents to ED complaining of constant right outer foot pain onset days ago. Patient denies trauma. No other concerns or symptoms at this time. PCP: Prince Jaime NP        Current Outpatient Medications   Medication Sig Dispense Refill    ibuprofen (MOTRIN) 800 mg tablet Take 1 Tab by mouth every eight (8) hours for 5 days. 15 Tab 0    sertraline (ZOLOFT) 100 mg tablet Take  by mouth daily.  traZODone (DESYREL) 100 mg tablet Take 100 mg by mouth nightly.  naproxen (NAPROSYN) 500 mg tablet Take 1 Tab by mouth two (2) times daily (with meals). 20 Tab 0    albuterol (PROVENTIL HFA, VENTOLIN HFA, PROAIR HFA) 90 mcg/actuation inhaler Take 1 Puff by inhalation every four (4) hours as needed for Wheezing. 1 Inhaler 0       Past History     Past Medical History:  Past Medical History:   Diagnosis Date    Asthma        Past Surgical History:  Past Surgical History:   Procedure Laterality Date    HX APPENDECTOMY      HX COLOSTOMY      HX GI      HX ORTHOPAEDIC      left knee    HX SPLENECTOMY         Family History:  No family history on file. Social History:  Social History     Tobacco Use    Smoking status: Never Smoker    Smokeless tobacco: Never Used   Substance Use Topics    Alcohol use: No    Drug use: Not on file       Allergies:  No Known Allergies      Review of Systems     Review of Systems   Constitutional: Negative for diaphoresis and fever. HENT: Negative for congestion and sore throat. Eyes: Negative for pain and itching. Respiratory: Negative for cough and shortness of breath. Cardiovascular: Negative for chest pain and palpitations.    Gastrointestinal: Negative for abdominal pain and diarrhea. Endocrine: Negative for polydipsia and polyuria. Genitourinary: Negative for dysuria and hematuria. Musculoskeletal: Negative for arthralgias and myalgias. Right foot pain   Skin: Negative for rash and wound. Neurological: Negative for seizures and syncope. Hematological: Does not bruise/bleed easily. Psychiatric/Behavioral: Negative for agitation and hallucinations. Physical Exam     Patient Vitals for the past 12 hrs:   Temp Pulse Resp BP SpO2   12/16/18 0027 98.1 °F (36.7 °C) 88 16 130/74 100 %         Physical Exam   Constitutional: She appears well-developed and well-nourished. HENT:   Head: Normocephalic and atraumatic. Eyes: Conjunctivae are normal. No scleral icterus. Neck: Normal range of motion. Neck supple. No JVD present. Cardiovascular: Normal rate, regular rhythm and intact distal pulses. Pulmonary/Chest: Effort normal. No respiratory distress. Musculoskeletal: Normal range of motion. She exhibits tenderness. Tenderness over deltoid ligament, otherwise non tender ankle joint, non tender foot. Neurological: She is alert. Skin: Skin is warm and dry. Psychiatric: Judgment and thought content normal.   Nursing note and vitals reviewed. Diagnostic Study Results   Labs -  No results found for this or any previous visit (from the past 12 hour(s)). Radiologic Studies -   No orders to display     No results found. Medications ordered:   Medications - No data to display      Medical Decision Making   Initial Medical Decision Making and DDx:  MDM: Discussed xray, likely will not . Do not suspect fracture, discussed foot wear insoles, treat for inflammation with Ibuprofen and cold therapy. ED Course: Progress Notes, Reevaluation, and Consults:         I am the first provider for this patient.     I reviewed the vital signs, available nursing notes, past medical history, past surgical history, family history and social history. Vital Signs-Reviewed the patient's vital signs. Pulse Oximetry Analysis - 100%    Cardiac Monitor:  Rate/Rhythm:  88    Records Reviewed: Nursing Notes and Old Medical Records (Time of Review: 1:04 AM)      Diagnosis     Clinical Impression:   1. Right foot pain        Disposition: DC    Follow-up Information     Follow up With Specialties Details Why Contact Info    Bernadette Taylor NP Nurse Practitioner In 2 days  [address] [facility]  [address]                Medication List      START taking these medications    ibuprofen 800 mg tablet  Commonly known as:  MOTRIN  Take 1 Tab by mouth every eight (8) hours for 5 days. ASK your doctor about these medications    albuterol 90 mcg/actuation inhaler  Commonly known as:  PROVENTIL HFA, VENTOLIN HFA, PROAIR HFA  Take 1 Puff by inhalation every four (4) hours as needed for Wheezing. naproxen 500 mg tablet  Commonly known as:  NAPROSYN  Take 1 Tab by mouth two (2) times daily (with meals). traZODone 100 mg tablet  Commonly known as:  DESYREL     ZOLOFT 100 mg tablet  Generic drug:  sertraline           Where to Get Your Medications      Information about where to get these medications is not yet available    Ask your nurse or doctor about these medications  · ibuprofen 800 mg tablet       _______________________________    Attestations:  Scribe Attestation     Adithya Guevara acting as a scribe for and in the presence of Isha Graff MD      December 16, 2018 at 1:16 AM       Provider Attestation:      I personally performed the services described in the documentation, reviewed the documentation, as recorded by the scribe in my presence, and it accurately and completely records my words and actions.  December 16, 2018 at 1:16 AM - Isha Graff MD    _______________________________

## 2018-12-16 NOTE — DISCHARGE INSTRUCTIONS
Foot Pain: Care Instructions  Your Care Instructions  Foot injuries that cause pain and swelling are fairly common. Almost all sports or home repair projects can cause a misstep that ends up as foot pain. Normal wear and tear, especially as you get older, also can cause foot pain. Most minor foot injuries will heal on their own, and home treatment is usually all you need to do. If you have a severe injury, you may need tests and treatment. Follow-up care is a key part of your treatment and safety. Be sure to make and go to all appointments, and call your doctor if you are having problems. It's also a good idea to know your test results and keep a list of the medicines you take. How can you care for yourself at home? · Take pain medicines exactly as directed. ? If the doctor gave you a prescription medicine for pain, take it as prescribed. ? If you are not taking a prescription pain medicine, ask your doctor if you can take an over-the-counter medicine. · Rest and protect your foot. Take a break from any activity that may cause pain. · Put ice or a cold pack on your foot for 10 to 20 minutes at a time. Put a thin cloth between the ice and your skin. · Prop up the sore foot on a pillow when you ice it or anytime you sit or lie down during the next 3 days. Try to keep it above the level of your heart. This will help reduce swelling. · Your doctor may recommend that you wrap your foot with an elastic bandage. Keep your foot wrapped for as long as your doctor advises. · If your doctor recommends crutches, use them as directed. · Wear roomy footwear. · As soon as pain and swelling end, begin gentle exercises of your foot. Your doctor can tell you which exercises will help. When should you call for help? Call 911 anytime you think you may need emergency care.  For example, call if:    · Your foot turns pale, white, blue, or cold.    Call your doctor now or seek immediate medical care if:    · You cannot move or stand on your foot.     · Your foot looks twisted or out of its normal position.     · Your foot is not stable when you step down.     · You have signs of infection, such as:  ? Increased pain, swelling, warmth, or redness. ? Red streaks leading from the sore area. ? Pus draining from a place on your foot. ? A fever.     · Your foot is numb or tingly.    Watch closely for changes in your health, and be sure to contact your doctor if:    · You do not get better as expected.     · You have bruises from an injury that last longer than 2 weeks. Where can you learn more? Go to http://dennis-todd.info/. Enter C763 in the search box to learn more about \"Foot Pain: Care Instructions. \"  Current as of: November 29, 2017  Content Version: 11.8  © 5585-2385 [employer]. Care instructions adapted under license by [employer] (which disclaims liability or warranty for this information). If you have questions about a medical condition or this instruction, always ask your healthcare professional. [address] any warranty or liability for your use of this information.